# Patient Record
Sex: FEMALE | URBAN - METROPOLITAN AREA
[De-identification: names, ages, dates, MRNs, and addresses within clinical notes are randomized per-mention and may not be internally consistent; named-entity substitution may affect disease eponyms.]

---

## 2019-03-13 ENCOUNTER — APPOINTMENT (RX ONLY)
Dept: URBAN - METROPOLITAN AREA CLINIC 123 | Facility: CLINIC | Age: 34
Setting detail: DERMATOLOGY
End: 2019-03-13

## 2019-03-13 DIAGNOSIS — L23.2 ALLERGIC CONTACT DERMATITIS DUE TO COSMETICS: ICD-10-CM

## 2019-03-13 DIAGNOSIS — L21.8 OTHER SEBORRHEIC DERMATITIS: ICD-10-CM

## 2019-03-13 PROCEDURE — ? ADDITIONAL NOTES

## 2019-03-13 PROCEDURE — ? PRESCRIPTION

## 2019-03-13 PROCEDURE — ? INVENTORY

## 2019-03-13 PROCEDURE — ? RECOMMENDATIONS

## 2019-03-13 PROCEDURE — 99202 OFFICE O/P NEW SF 15 MIN: CPT

## 2019-03-13 PROCEDURE — ? COUNSELING

## 2019-03-13 RX ORDER — CLOBETASOL PROPIONATE 0.46 MG/ML
SOLUTION TOPICAL
Qty: 1 | Refills: 4 | Status: ERX | COMMUNITY
Start: 2019-03-13

## 2019-03-13 RX ORDER — CLOBETASOL PROPIONATE 0.5 MG/G
CREAM TOPICAL
Qty: 1 | Refills: 3 | Status: ERX | COMMUNITY
Start: 2019-03-13

## 2019-03-13 RX ORDER — KETOCONAZOLE 20 MG/ML
SHAMPOO TOPICAL
Qty: 1 | Refills: 11 | Status: ERX | COMMUNITY
Start: 2019-03-13

## 2019-03-13 RX ADMIN — CLOBETASOL PROPIONATE: 0.5 CREAM TOPICAL at 14:20

## 2019-03-13 RX ADMIN — CLOBETASOL PROPIONATE: 0.46 SOLUTION TOPICAL at 14:19

## 2019-03-13 RX ADMIN — KETOCONAZOLE: 20 SHAMPOO TOPICAL at 14:17

## 2019-03-13 ASSESSMENT — LOCATION ZONE DERM
LOCATION ZONE: SCALP
LOCATION ZONE: SCALP

## 2019-03-13 ASSESSMENT — LOCATION SIMPLE DESCRIPTION DERM
LOCATION SIMPLE: POSTERIOR SCALP
LOCATION SIMPLE: SCALP

## 2019-03-13 ASSESSMENT — LOCATION DETAILED DESCRIPTION DERM
LOCATION DETAILED: LEFT POSTERIOR PARIETAL SCALP
LOCATION DETAILED: RIGHT OCCIPITAL SCALP
LOCATION DETAILED: RIGHT SUPERIOR PARIETAL SCALP
LOCATION DETAILED: RIGHT SUPERIOR OCCIPITAL SCALP

## 2019-03-13 NOTE — PROCEDURE: RECOMMENDATIONS
Detail Level: Zone
Recommendations (Free Text): Penelope hair color. Used Gill OTC with rxn. Stylist that straightened hair does not rec penelope color. Advise to f/up with .